# Patient Record
Sex: FEMALE | Race: WHITE
[De-identification: names, ages, dates, MRNs, and addresses within clinical notes are randomized per-mention and may not be internally consistent; named-entity substitution may affect disease eponyms.]

---

## 2018-03-21 ENCOUNTER — HOSPITAL ENCOUNTER (EMERGENCY)
Dept: HOSPITAL 25 - ED | Age: 48
Discharge: HOME | End: 2018-03-21
Payer: SELF-PAY

## 2018-03-21 VITALS — SYSTOLIC BLOOD PRESSURE: 126 MMHG | DIASTOLIC BLOOD PRESSURE: 70 MMHG

## 2018-03-21 DIAGNOSIS — F41.9: ICD-10-CM

## 2018-03-21 DIAGNOSIS — R07.89: Primary | ICD-10-CM

## 2018-03-21 DIAGNOSIS — F17.210: ICD-10-CM

## 2018-03-21 PROCEDURE — 99282 EMERGENCY DEPT VISIT SF MDM: CPT

## 2018-04-09 NOTE — ED
HPI Chest Pain





- HPI Summary


HPI Summary: 


Pt here w/ Rt sided chest pain, just under clavicle/into upper chest area. 

Started this morning. Worse w/ deep breath and with certain movements of chest 

wall/Rt arm but can't describe as it's inconsistent. Denies pain radiating from 

this area, nausea, vomiting, dizziness, neck/jaw pain, sweating, fatigue, cough

, bloody cough, fever. No injury to the area however admits she has had issues 

with the muscles in this area when she gets stressed and has been stressed as 

of late. Also cares for her granddaughter who is young/small enough to lift/

carry - possibly contributing factor.





Smokes. 


Possible hormone therapy?


No recent travel/trauma/history of cancer.


Concerned about PE/clot.





- History of Current Complaint


Chief Complaint: EDChestWallPain


Time Seen by Provider: 03/21/18 16:23


Hx Obtained From: Patient


Pain Intensity: 9





- Allergy/Home Medications


Allergies/Adverse Reactions: 


 Allergies











Allergy/AdvReac Type Severity Reaction Status Date / Time


 


No Known Allergies Allergy   Verified 03/21/18 14:42














PMH/Surg Hx/FS Hx/Imm Hx


Previously Healthy: Yes


Endocrine/Hematology History: 


   Denies: Hx Anticoagulant Therapy, Hx Blood Disorders, Hx Diabetes, Hx 

Systemic Lupus Erythematosus, Hx Thyroid Disease, Hx Coagulopothy


Cardiovascular History: 


   Denies: Hx Congestive Heart Failure, Hx Coronary Artery Disease, Hx 

Hypertension


 History: 


   Denies: Hx Dialysis, Hx Renal Disease


Musculoskeletal History: 


   Denies: Hx Rheumatoid Arthritis





- Cancer History


Hx Chemotherapy: No





- Surgical History


Surgery Procedure, Year, and Place: hysterectomy and bladder repair





- Immunization History


Immunizations Up to Date: Yes


Infectious Disease History: No


Infectious Disease History: 


   Denies: Traveled Outside the [place] in Last 30 Days





- Social History


Occupation: Employed Full-time


Lives: With Family


Alcohol Use: Occasionally


Hx Substance Use: No


Substance Use Type: Reports: None


Hx Tobacco Use: Yes


Smoking Status (MU): Current Every Day Smoker





Review of Systems


Negative: Fever


Eyes: Negative


ENT: Negative


Positive: Chest Pain.  Negative: Palpitations


Respiratory: Negative


Negative: Shortness Of Breath, Cough


Gastrointestinal: Negative


Positive: no symptoms reported


Musculoskeletal: Other - pain around clavicle


Skin: Negative


Neurological: Negative


Positive: Anxious


All Other Systems Reviewed And Are Negative: Yes





Physical Exam


Triage Information Reviewed: Yes


Vital Signs On Initial Exam: 


 Initial Vitals











Temp Pulse Resp BP Pulse Ox


 


 96.9 F   60   17   126/70   96 


 


 03/21/18 14:39  03/21/18 14:39  03/21/18 14:39  03/21/18 14:39  03/21/18 14:39











Vital Signs Reviewed: Yes


Appearance: Positive: Well-Appearing - concerned, No Pain Distress, Well-

Nourished


Skin: Positive: Warm, Skin Color Reflects Adequate Perfusion, Dry - no erythema

, no ecchymosis, no lesions over affected area


Head/Face: Positive: Normal Head/Face Inspection


Eyes: Positive: Normal, EOMI, Conjunctiva Clear


ENT: Positive: Normal ENT inspection, Hearing grossly normal, Pharynx normal - 

mucosa moist


Neck: Positive: Supple, Nontender, No Lymphadenopathy


Respiratory/Lung Sounds: Positive: Clear to Auscultation, Breath Sounds 

Present.  Negative: Rales, Rhonchi, Subcutaneous Emphysema, Stridor, Tracheal 

Deviation, Wheezes, Unable to speak in full sentences, Fatigue


Cardiovascular: Positive: Normal, RRR, Pulses are Symmetrical in both Upper and 

Lower Extremities, Leg Edema Right - (-) Lona's B/L, S1, S2.  Negative: Murmur

, Rub - no bruits, Leg Edema Left


Abdomen Description: Positive: Nontender, Soft


Bowel Sounds: Positive: Present


Musculoskeletal: Positive: Strength/ROM Intact, Pain @ - Rt pectoralis, just 

inferior to Rt clavicle


Neurological: Positive: Normal, Sensory/Motor Intact, Alert, Oriented to Person 

Place, Time, CN Intact II-III


Psychiatric: Positive: Anxious - but cooperative





Diagnostics





- Vital Signs


 Vital Signs











  Temp Pulse Resp BP Pulse Ox


 


 03/21/18 14:39  96.9 F  60  17  126/70  96














- Laboratory


Lab Statement: Any lab studies that have been ordered have been reviewed, and 

results considered in the medical decision making process.





Chest Pain Course/Dx





- Course


Course Of Treatment: Pt presents w/ Rt pectoralis/periclavicle pain. Admits she'

s had this before with stress however is concerned about clot this time. She 

does admit to smoking and possible hormone therapy. Discussed course of w/u w/ 

pt who agrees w/ plan however when staff returned to her to initiate tests, etc

, pt had left. No explanation provided. She was aware of possible conditions 

and their ill effects if they progressed as this was discussed as part of the w/

u process. If she returns, would reinitiate w/u unless new information was 

presented.





- Diagnoses


Provider Diagnoses: 


 Chest pain








Discharge





- Sign-Out/Discharge


Documenting (check all that apply): Discharge





- Discharge Plan


Condition: Good


Disposition: AGAINST MEDICAL ADVICE


Discharge Disposition Comment: Left in the middle of medical work up





- Billing Disposition and Condition


Condition: GOOD


Disposition: AMA